# Patient Record
Sex: MALE | Race: WHITE | NOT HISPANIC OR LATINO | Employment: UNEMPLOYED | RURAL
[De-identification: names, ages, dates, MRNs, and addresses within clinical notes are randomized per-mention and may not be internally consistent; named-entity substitution may affect disease eponyms.]

---
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
## 2024-02-20 NOTE — PROGRESS NOTES
Subjective   Martinez Wheeler is a 18 y.o. male.     Chief Complaint   Patient presents with    Knee Pain       Knee Pain   The incident occurred more than 1 week ago. There was no injury mechanism. The pain is present in the left knee and right knee. The quality of the pain is described as burning and aching. The pain is moderate. The pain has been Fluctuating since onset. Associated symptoms comments: Swelling, popping sound. He reports no foreign bodies present. The symptoms are aggravated by movement and weight bearing. He has tried NSAIDs for the symptoms. The treatment provided mild relief.            I personally reviewed and updated the patient's allergies, medications, problem list, and past medical, surgical, social, and family history. I have reviewed and confirmed the accuracy of the History of Present Illness and Review of Symptoms as documented by the MA/LPN/RN. Cj Patrick MD    Family History   Problem Relation Age of Onset    Thyroid cancer Maternal Uncle        Social History     Tobacco Use    Smoking status: Never    Smokeless tobacco: Never   Vaping Use    Vaping Use: Never used   Substance Use Topics    Alcohol use: Never    Drug use: Never       History reviewed. No pertinent surgical history.    Patient Active Problem List   Diagnosis    Annual visit for general adult medical examination with abnormal findings    Patellar tendinitis of both knees         Current Outpatient Medications:     FLUoxetine (PROzac) 10 MG capsule, Take 1 capsule by mouth Daily. (Patient not taking: Reported on 2/21/2024), Disp: 30 capsule, Rfl: 5    montelukast (Singulair) 10 MG tablet, Take 1 tablet by mouth Every Night. (Patient not taking: Reported on 2/21/2024), Disp: 30 tablet, Rfl: 5    predniSONE (DELTASONE) 5 MG tablet, 40mg x 3 days, 20mg x 3 days, 10mg x 3 days, 5mg x 3 days, Disp: 45 tablet, Rfl: 0    SUMAtriptan (IMITREX) 100 MG tablet, Take 1 tablet by mouth 1 (One) Time As Needed for Migraine (May  "repeat in two hours (Maximum two per day).). (Patient not taking: Reported on 2/21/2024), Disp: 9 tablet, Rfl: 12         Review of Systems   Constitutional:  Negative for chills and diaphoresis.   Eyes:  Negative for visual disturbance.   Respiratory:  Negative for shortness of breath.    Cardiovascular:  Negative for chest pain and palpitations.   Gastrointestinal:  Negative for abdominal pain and nausea.   Endocrine: Negative for polydipsia and polyphagia.   Musculoskeletal:  Negative for neck stiffness.   Skin:  Negative for color change and pallor.   Neurological:  Negative for seizures and syncope.   Hematological:  Negative for adenopathy.   Psychiatric/Behavioral:  Negative for hallucinations and suicidal ideas.        I have reviewed and confirmed the accuracy of the ROS as documented by the MA/LPN/RN Cj Patrick MD      Objective   /62 (BP Location: Left arm, Patient Position: Sitting, Cuff Size: Adult)   Pulse 86   Temp 98 °F (36.7 °C) (Temporal)   Resp 18   Ht 175.3 cm (69.02\")   Wt 73.7 kg (162 lb 6.4 oz)   SpO2 98%   BMI 23.97 kg/m²   BP Readings from Last 3 Encounters:   02/21/24 116/62   08/24/22 116/68   11/20/21 110/69 (26%, Z = -0.64 /  56%, Z = 0.15)*     *BP percentiles are based on the 2017 AAP Clinical Practice Guideline for boys     Wt Readings from Last 3 Encounters:   02/21/24 73.7 kg (162 lb 6.4 oz) (62%, Z= 0.30)*   08/24/22 71.7 kg (158 lb) (64%, Z= 0.36)*   11/20/21 72.6 kg (160 lb) (72%, Z= 0.58)*     * Growth percentiles are based on CDC (Boys, 2-20 Years) data.     Physical Exam  Constitutional:       Appearance: Normal appearance. He is well-developed. He is not diaphoretic.   Cardiovascular:      Rate and Rhythm: Normal rate and regular rhythm.      Pulses: Normal pulses.      Heart sounds: Normal heart sounds, S1 normal and S2 normal. No murmur heard.     No friction rub. No gallop.   Pulmonary:      Effort: Pulmonary effort is normal. No accessory muscle usage.    " "  Breath sounds: Normal breath sounds. No stridor. No decreased breath sounds, wheezing, rhonchi or rales.   Abdominal:      General: Bowel sounds are normal. There is no distension.      Palpations: Abdomen is soft. Abdomen is not rigid. There is no mass or pulsatile mass.      Tenderness: There is no abdominal tenderness. There is no guarding or rebound. Negative signs include Wilkins's sign.      Hernia: No hernia is present.   Musculoskeletal:      Right knee: Normal. No swelling, deformity, effusion or erythema. Normal range of motion. No tenderness. No medial joint line, lateral joint line, MCL, LCL or patellar tendon tenderness. No LCL laxity or MCL laxity. Normal patellar mobility.      Left knee: Normal. No swelling, deformity, effusion or erythema. Normal range of motion. No tenderness. No medial joint line, lateral joint line, MCL, LCL or patellar tendon tenderness. No LCL laxity or MCL laxity.Normal patellar mobility.      Comments: Anterior drawer and Lachman neg, Yayo neg   Skin:     General: Skin is warm and dry.      Coloration: Skin is not pale.   Neurological:      Mental Status: He is alert and oriented to person, place, and time.      Coordination: Coordination normal.      Gait: Gait normal.         Data / Lab Results:    No results found for: \"HGBA1C\"     Lab Results   Component Value Date    LDL 90 09/23/2022     No results found for: \"CHOL\"  Lab Results   Component Value Date    TRIG 68 09/23/2022     Lab Results   Component Value Date    HDL 38 (L) 09/23/2022     No results found for: \"PSA\"  Lab Results   Component Value Date    WBC 6.2 09/23/2022    HGB 15.8 09/23/2022    HCT 46.6 09/23/2022    MCV 88 09/23/2022     09/23/2022     Lab Results   Component Value Date    TSH 4.030 09/23/2022      Lab Results   Component Value Date    GLUCOSE 93 09/23/2022    BUN 16 09/23/2022    CREATININE 1.08 09/23/2022    BCR 15 09/23/2022    K 4.5 09/23/2022    CO2 25 09/23/2022    CALCIUM 9.9 " "09/23/2022    PROTENTOTREF 6.7 09/23/2022    ALBUMIN 5.0 09/23/2022    LABIL2 2.9 (H) 09/23/2022    AST 32 09/23/2022    ALT 21 09/23/2022     Lab Results   Component Value Date    RAYMOND Negative 09/23/2022      No results found for: \"CRP\"   No results found for: \"IRON\", \"TIBC\", \"FERRITIN\"   No results found for: \"TPCMSUON33\"       Assessment & Plan      Medications        Problem List         LOS    Health maintenance.  Doing well, vaccines current.  Discussed health maintenance, screening test, lifestyle modification.  Screening blood work benign  Leg pain.  Improved/resolved today.  Bilateral symptoms, onset with running track, likely secondary to shinsplints.  No point tenderness or sign of stress fracture today.  Ice, rehabilitation exercises discussed.  Start course prednisone.  Check rheum panel.  He has decreased his running schedule as he has completed track.  Call return if persistent symptoms.  Patella tendinitis.  Left, overall benign exam today.  Ice, rehabilitation exercise discussed.  Start course of prednisone.  With some patella laxity rehabilitation exercise discussed.  Call return if persistent symptoms.      Diagnoses and all orders for this visit:    1. Acute pain of both knees (Primary)  -     predniSONE (DELTASONE) 5 MG tablet; 40mg x 3 days, 20mg x 3 days, 10mg x 3 days, 5mg x 3 days  Dispense: 45 tablet; Refill: 0    2. Patellar tendinitis of both knees              Expected course, medications, and adverse effects discussed.  Call or return if worsening or persistent symptoms.  I wore protective equipment throughout this patient encounter including a mask, gloves, and eye protection.  Hand hygiene was performed before donning protective equipment and after removal when leaving the room. The complete contents of the Assessment and Plan and Data/Lab Results as documented above have been reviewed and addressed by myself with the patient today as part of an ongoing evaluation / treatment plan.  " If some of the documentation has been copied from a previous note and is unchanged it indicates that this problem / plan has been assessed today but is stable from a previous visit and no changes have been recommended.

## 2024-02-21 ENCOUNTER — OFFICE VISIT (OUTPATIENT)
Dept: FAMILY MEDICINE CLINIC | Facility: CLINIC | Age: 19
End: 2024-02-21
Payer: COMMERCIAL

## 2024-02-21 VITALS
TEMPERATURE: 98 F | HEART RATE: 86 BPM | HEIGHT: 69 IN | OXYGEN SATURATION: 98 % | RESPIRATION RATE: 18 BRPM | WEIGHT: 162.4 LBS | BODY MASS INDEX: 24.05 KG/M2 | SYSTOLIC BLOOD PRESSURE: 116 MMHG | DIASTOLIC BLOOD PRESSURE: 62 MMHG

## 2024-02-21 DIAGNOSIS — M76.52 PATELLAR TENDINITIS OF BOTH KNEES: ICD-10-CM

## 2024-02-21 DIAGNOSIS — M25.561 ACUTE PAIN OF BOTH KNEES: Primary | ICD-10-CM

## 2024-02-21 DIAGNOSIS — M76.51 PATELLAR TENDINITIS OF BOTH KNEES: ICD-10-CM

## 2024-02-21 DIAGNOSIS — M25.562 ACUTE PAIN OF BOTH KNEES: Primary | ICD-10-CM

## 2024-02-21 PROCEDURE — 99213 OFFICE O/P EST LOW 20 MIN: CPT | Performed by: FAMILY MEDICINE

## 2024-02-21 RX ORDER — PREDNISONE 5 MG/1
TABLET ORAL
Qty: 45 TABLET | Refills: 0 | Status: SHIPPED | OUTPATIENT
Start: 2024-02-21

## 2024-02-27 PROBLEM — M76.52 PATELLAR TENDINITIS OF BOTH KNEES: Status: ACTIVE | Noted: 2024-02-27

## 2024-02-27 PROBLEM — M76.51 PATELLAR TENDINITIS OF BOTH KNEES: Status: ACTIVE | Noted: 2024-02-27
